# Patient Record
Sex: MALE | Race: OTHER
[De-identification: names, ages, dates, MRNs, and addresses within clinical notes are randomized per-mention and may not be internally consistent; named-entity substitution may affect disease eponyms.]

---

## 2018-10-04 ENCOUNTER — HOSPITAL ENCOUNTER (EMERGENCY)
Dept: HOSPITAL 80 - FED | Age: 23
Discharge: HOME | End: 2018-10-04
Payer: COMMERCIAL

## 2018-10-04 VITALS — SYSTOLIC BLOOD PRESSURE: 144 MMHG | DIASTOLIC BLOOD PRESSURE: 50 MMHG
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
## 2018-10-04 DIAGNOSIS — S83.92XA: Primary | ICD-10-CM
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
## 2018-10-04 PROCEDURE — L1830 KO IMMOB CANVAS LONG PRE OTS: HCPCS

## 2018-10-04 NOTE — EDPHY
HPI/HX/ROS/PE/MDM


Narrative: 


CHIEF COMPLAINT: Left knee pain 





HISTORY OF PRESENT ILLNESS:   


The patient is a 22 y/o male arriving via EMS complaining of left knee pain 

while sitting down at the gym this morning. Prior to the injury he was working 

out including high knees and jumping jacks. When he tried sitting down on a mat 

at the gym, he heard a popping in his left knee and fell back.  Patient 

describes some external rotation at the hip and valgus stress on the knee while 

he was going to sit in a cross-legged position.  He denies hitting his head 

upon falling backwards. He tried standing up, but had difficulty and has been 

unable to bear weight since. He denies taking medications for his pain. He 

denies an abnormal appearance to his knee. No numbness or tingling in his foot. 

He denies prior injury of his knee or prior history of surgery. 





No fever, chills, chest pain, shortness of breath, palpitations, vomiting, 

diarrhea, urinary complaints, headache, lightheadedness. 





REVIEW OF SYSTEMS:


Aside from elements discussed in the HPI, a comprehensive 10-system review of 

systems was reviewed and is negative.





PAST MEDICAL HISTORY: Denies





SOCIAL HISTORY: Lives in [place], employed, student at 





VITAL SIGNS: Reviewed by me


GENERAL: Well-developed, well-nourished, resting comfortably in no respiratory 

distress.


EXTREMITIES: Left Knee: Area of most pain is at the lateral joint line, near 

the popliteal fossa, almost where hamstrings tendon is inserting. His extensor 

mechanism is intact. Limited ROM of left knee secondary to pain.  Patella is in 

normal position.  No patellar tenderness or pain.  No anterior joint line pain.

  No medial joint line pain.  No significant swelling.  Normal straight leg 

raise.  Good pulses distally. Calf compartment is soft and nontender. No edema.

  Otherwise range of motion is normal throughout.





Portions of this note were transcribed by a medical scribe.  I personally 

performed a history, physical exam, medical decision making, and confirmed 

accuracy of information the transcribed note.





ED Course: 


The patient is a 22 y/o male arriving via EMS presenting with left knee pain 

while sitting down cross leg did at the gym this morning. On exam the area of 

most pain is  lateral left knee popliteal fossa almost where the hamstrings 

tendon insertion is. He has limited ROM of his left knee secondary to pain but 

his extensor mechanism is intact. He has good pulses distally and his calf 

compartment is soft and nontender. Left knee x-ray ordered; 600mg PO Motrin 

administered.





0845: I reviewed the patient's knee x-ray which reveals no significant 

findings. The patient most likely has a left knee sprain.





0852: Reassessed patient and discussed imaging findings. I have advised him to 

follow up with an orthopedic surgeon in the next 3-4 days. I have also given 

him my RICE instructions and advised him to wear a knee immobilizer during the 

day if it improves his pain. Return precautions provided; patient is 

comfortable with this plan.





MDM: 





Differential diagnosis for the patient's injury was considered including but 

not limited to contusion, abrasion, laceration, fracture, open fracture, or 

dislocation.





- Data Points


Imaging Results: 


 Imaging Impressions





Knee X-Ray  10/04/18 08:00


Impression: No evidence for acute osseous abnormality left knee.











Imaging: I viewed and interpreted images myself


Medications Given: 


 








Discontinued Medications





Ibuprofen (Motrin)  600 mg PO EDNOW ONE


   Stop: 10/04/18 08:44


   Last Admin: 10/04/18 08:53 Dose:  600 mg








General


Time Seen by Provider: 10/04/18 08:01


Initial Vital Signs: 


 Initial Vital Signs











Temperature (C)  36.5 C   10/04/18 07:40


 


Heart Rate  69   10/04/18 07:40


 


Respiratory Rate  16   10/04/18 07:40


 


Blood Pressure  154/49 H  10/04/18 07:40


 


O2 Sat (%)  96   10/04/18 07:40








 











O2 Delivery Mode               Room Air














Allergies/Adverse Reactions: 


 





No Known Allergies Allergy (Unverified 10/04/18 07:39)


 








Home Medications: 














 Medication  Instructions  Recorded


 


NK [No Known Home Meds]  10/04/18














Departure





- Departure


Disposition: Home, Routine, Self-Care


Clinical Impression: 


Left knee sprain


Qualifiers:


 Encounter type: initial encounter Involved ligament of knee: unspecified 

ligament Qualified Code(s): S83.92XA - Sprain of unspecified site of left knee, 

initial encounter





Condition: Good


Instructions:  Knee Sprain (ED), Knee Pain (ED)


Additional Instructions: 


Wear the knee immobilizer during the day if it make the knee pain better. Do 

not wear it at night.





Mainstay of therapy is rest, ice, immobilization, elevation, and nonsteroidal 

anti-inflammatories for pain and to decrease swelling.  


Apply ice for 20-30 minutes every 2-3 hours for the next 48 hours.


I recommend Ibuprofen (Motrin,Advil) or Naproxen Sodium (Aleve) for pain and 

anti-inflammatory effects.  You may take either one, but do not take both.


Your dose is:     Ibuprofen 600mg every 6-8 hours with food. 


                                           OR


            Naproxen Sodium (Aleve) 220mg every 12 hours.





Followup with the Dr. Zheng, orthopedic surgeon, in the next 3-4 days.





Return to the emergency department for worsening pain, swelling, numbness, 

weakness or other concerns.  





Referrals: 


Sports Medicine [Outside] - As per Instructions


Sukhdeep Zheng MD [Medical Doctor] - As per Instructions


Stand Alone Forms:  School Excuse


Report Scribed for: Summer Nam


Report Scribed by: Nona Butt


Date of Report: 10/04/18


Time of Report: 08:02